# Patient Record
Sex: FEMALE | Race: ASIAN | URBAN - METROPOLITAN AREA
[De-identification: names, ages, dates, MRNs, and addresses within clinical notes are randomized per-mention and may not be internally consistent; named-entity substitution may affect disease eponyms.]

---

## 2022-01-21 ENCOUNTER — APPOINTMENT (RX ONLY)
Dept: URBAN - METROPOLITAN AREA CLINIC 142 | Facility: CLINIC | Age: 42
Setting detail: DERMATOLOGY
End: 2022-01-21

## 2022-01-21 DIAGNOSIS — L73.8 OTHER SPECIFIED FOLLICULAR DISORDERS: ICD-10-CM

## 2022-01-21 DIAGNOSIS — D22 MELANOCYTIC NEVI: ICD-10-CM

## 2022-01-21 DIAGNOSIS — L81.1 CHLOASMA: ICD-10-CM

## 2022-01-21 PROBLEM — D48.5 NEOPLASM OF UNCERTAIN BEHAVIOR OF SKIN: Status: ACTIVE | Noted: 2022-01-21

## 2022-01-21 PROCEDURE — ? IN-HOUSE DISPENSING PHARMACY

## 2022-01-21 PROCEDURE — 99203 OFFICE O/P NEW LOW 30 MIN: CPT | Mod: 25

## 2022-01-21 PROCEDURE — ? BIOPSY BY SHAVE METHOD

## 2022-01-21 PROCEDURE — ? COUNSELING

## 2022-01-21 PROCEDURE — 11102 TANGNTL BX SKIN SINGLE LES: CPT

## 2022-01-21 ASSESSMENT — LOCATION DETAILED DESCRIPTION DERM
LOCATION DETAILED: INFERIOR MID FOREHEAD
LOCATION DETAILED: LEFT BUTTOCK
LOCATION DETAILED: LEFT INFERIOR CENTRAL MALAR CHEEK
LOCATION DETAILED: RIGHT INFERIOR CENTRAL MALAR CHEEK
LOCATION DETAILED: RIGHT SUPERIOR FOREHEAD

## 2022-01-21 ASSESSMENT — LOCATION SIMPLE DESCRIPTION DERM
LOCATION SIMPLE: RIGHT FOREHEAD
LOCATION SIMPLE: LEFT CHEEK
LOCATION SIMPLE: INFERIOR FOREHEAD
LOCATION SIMPLE: RIGHT CHEEK
LOCATION SIMPLE: LEFT BUTTOCK

## 2022-01-21 ASSESSMENT — LOCATION ZONE DERM
LOCATION ZONE: FACE
LOCATION ZONE: TRUNK

## 2022-01-21 NOTE — PROCEDURE: MIPS QUALITY
Quality 154 Part B: Falls: Risk Screening (Should Be Reported With Measure 155.): Patient screened for future fall risk; documentation of no falls in the past year or only one fall without injury in the past year
Quality 110: Preventive Care And Screening: Influenza Immunization: Influenza immunization was not ordered or administered, reason not given
Quality 154 Part A: Falls: Risk Assessment (Should Be Reported With Measure 155.): Falls risk assessment completed and documented in the past 12 months.
Detail Level: Detailed
Quality 155 (Denominator): Falls Plan Of Care: Plan of Care not Documented, Reason not Otherwise Specified
Quality 431: Preventive Care And Screening: Unhealthy Alcohol Use - Screening: Patient identified as an unhealthy alcohol user when screened for unhealthy alcohol use using a systematic screening method and received brief counseling
Quality 226: Preventive Care And Screening: Tobacco Use: Screening And Cessation Intervention: Patient screened for tobacco use and is an ex/non-smoker
Quality 47: Advance Care Plan: Advance care planning not documented, reason not otherwise specified.

## 2022-01-21 NOTE — PROCEDURE: BIOPSY BY SHAVE METHOD
Hide Additional Anticipated Plan?: No
Wound Care: Vaseline
Additional Anesthesia Volume In Cc (Will Not Render If 0): 0
Electrodesiccation And Curettage Text: The wound bed was treated with electrodesiccation and curettage after the biopsy was performed.
Detail Level: Detailed
Anesthesia Volume In Cc: 0.5
Anesthesia Type: 1% lidocaine with epinephrine
Notification Instructions: Patient will be notified of biopsy results. However, patient instructed to call the office if not contacted within 2 weeks.
Biopsy Type: H and E
Consent: Written consent was obtained and risks were reviewed including but not limited to scarring, infection, bleeding, scabbing, incomplete removal, nerve damage and allergy to anesthesia.
Information: Selecting Yes will display possible errors in your note based on the variables you have selected. This validation is only offered as a suggestion for you. PLEASE NOTE THAT THE VALIDATION TEXT WILL BE REMOVED WHEN YOU FINALIZE YOUR NOTE. IF YOU WANT TO FAX A PRELIMINARY NOTE YOU WILL NEED TO TOGGLE THIS TO 'NO' IF YOU DO NOT WANT IT IN YOUR FAXED NOTE.
Dressing: bandage
Biopsy Method: Dermablade
Silver Nitrate Text: The wound bed was treated with silver nitrate after the biopsy was performed.
Billing Type: Third-Party Bill
Type Of Destruction Used: Curettage
Electrodesiccation Text: The wound bed was treated with electrodesiccation after the biopsy was performed.
Post-Care Instructions: I reviewed with the patient in detail post-care instructions. Patient is to keep the biopsy site dry overnight, and then apply bacitracin twice daily until healed. Patient may apply hydrogen peroxide soaks to remove any crusting.
Depth Of Biopsy: dermis
Hemostasis: Drysol
Was A Bandage Applied: Yes
Additional Anticipated Plans: If malignant consider excision
Cryotherapy Text: The wound bed was treated with cryotherapy after the biopsy was performed.

## 2022-04-18 ENCOUNTER — APPOINTMENT (RX ONLY)
Dept: URBAN - METROPOLITAN AREA CLINIC 142 | Facility: CLINIC | Age: 42
Setting detail: DERMATOLOGY
End: 2022-04-18

## 2022-04-18 DIAGNOSIS — L81.1 CHLOASMA: ICD-10-CM | Status: WELL CONTROLLED

## 2022-04-18 PROCEDURE — 99212 OFFICE O/P EST SF 10 MIN: CPT

## 2022-04-18 PROCEDURE — ? TREATMENT REGIMEN

## 2022-04-18 PROCEDURE — ? COUNSELING

## 2022-04-18 ASSESSMENT — LOCATION ZONE DERM: LOCATION ZONE: FACE

## 2022-04-18 ASSESSMENT — LOCATION DETAILED DESCRIPTION DERM
LOCATION DETAILED: RIGHT INFERIOR CENTRAL MALAR CHEEK
LOCATION DETAILED: INFERIOR MID FOREHEAD
LOCATION DETAILED: LEFT INFERIOR CENTRAL MALAR CHEEK

## 2022-04-18 ASSESSMENT — LOCATION SIMPLE DESCRIPTION DERM
LOCATION SIMPLE: RIGHT CHEEK
LOCATION SIMPLE: LEFT CHEEK
LOCATION SIMPLE: INFERIOR FOREHEAD

## 2022-12-27 ENCOUNTER — APPOINTMENT (RX ONLY)
Dept: URBAN - METROPOLITAN AREA CLINIC 142 | Facility: CLINIC | Age: 42
Setting detail: DERMATOLOGY
End: 2022-12-27

## 2022-12-27 DIAGNOSIS — L81.1 CHLOASMA: ICD-10-CM

## 2022-12-27 PROCEDURE — ? IN-HOUSE DISPENSING PHARMACY

## 2022-12-27 NOTE — PROCEDURE: IN-HOUSE DISPENSING PHARMACY
Product 37 Unit Type: mg
Product 18 Refills: 0
Product 1 Refills: 1
Product 2 Unit Type: ml
Name Of Product 3: Hydroquinone Emulsion
Product 1 Amount/Unit (Numbers Only): 30
Product 3 Refills: 2
Name Of Product 2: Brimodidine Rosacea Gel
Detail Level: Zone
Product 2 Application Directions: Apply to face QAM
Name Of Product 1: Rosacea Triple Gel
Send Charges To Patient Encounter: Yes
Product 3 Application Directions: Apply to whole face QHS for 3 months, then take one month off, repeat as needed
Product 2 Refills: 3

## 2023-11-07 ENCOUNTER — APPOINTMENT (RX ONLY)
Dept: URBAN - METROPOLITAN AREA CLINIC 141 | Facility: CLINIC | Age: 43
Setting detail: DERMATOLOGY
End: 2023-11-07

## 2023-11-07 DIAGNOSIS — Z41.9 ENCOUNTER FOR PROCEDURE FOR PURPOSES OTHER THAN REMEDYING HEALTH STATE, UNSPECIFIED: ICD-10-CM

## 2023-11-07 PROCEDURE — ? INVENTORY

## 2023-11-07 PROCEDURE — ? CLEAR AND BRILLIANT LASER

## 2023-11-07 ASSESSMENT — LOCATION ZONE DERM: LOCATION ZONE: FACE

## 2023-11-07 ASSESSMENT — LOCATION DETAILED DESCRIPTION DERM
LOCATION DETAILED: LEFT INFERIOR MEDIAL FOREHEAD
LOCATION DETAILED: INFERIOR MID FOREHEAD

## 2023-11-07 ASSESSMENT — LOCATION SIMPLE DESCRIPTION DERM
LOCATION SIMPLE: INFERIOR FOREHEAD
LOCATION SIMPLE: LEFT FOREHEAD

## 2023-11-07 NOTE — PROCEDURE: CLEAR AND BRILLIANT LASER
Length Of Topical Anesthesia Application (Optional): 30 minutes
Post-Procedure Care: Avene gentle moisturizer and Avene thermal water mist was applied to patients face immediately post treatment. Ice packs were given to patient to apply for five minutes on and five minutes off for about thirty minutes. Post care was verbally reviewed with patient and patient was given a paper sheet to take home with detailed post care instructions.
Laser Type: Clear and Brilliant 1440nm
Post-Care Instructions: I reviewed with the patient in detail post-care instructions. Patient should stay away from the sun for 5-7 days and wear sun protection until treated areas are fully healed.
Consent: Written consent obtained, risks reviewed including but not limited to crusting, scabbing, blistering, scarring, darker or lighter pigmentary change, incidental hair removal, bruising, and/or incomplete removal.
Topical Anesthesia?: PLAST (20% benzocaine, 8% lidocaine, 4% tetracaine)
Energy Level: High
Detail Level: Zone
Passes (Will Not Render If 0): 8

## 2023-11-07 NOTE — HPI: OTHER
Condition:: Uneven skin textures and tones on face
Please Describe Your Condition:: Patient is interested in clear and brilliant

## 2023-12-12 ENCOUNTER — APPOINTMENT (RX ONLY)
Dept: URBAN - METROPOLITAN AREA CLINIC 141 | Facility: CLINIC | Age: 43
Setting detail: DERMATOLOGY
End: 2023-12-12

## 2023-12-12 DIAGNOSIS — Z41.9 ENCOUNTER FOR PROCEDURE FOR PURPOSES OTHER THAN REMEDYING HEALTH STATE, UNSPECIFIED: ICD-10-CM

## 2023-12-12 PROCEDURE — ? INVENTORY

## 2023-12-12 PROCEDURE — ? ADDITIONAL NOTES

## 2023-12-12 PROCEDURE — ? CLEAR AND BRILLIANT LASER

## 2023-12-12 ASSESSMENT — LOCATION ZONE DERM: LOCATION ZONE: FACE

## 2023-12-12 ASSESSMENT — LOCATION DETAILED DESCRIPTION DERM: LOCATION DETAILED: INFERIOR MID FOREHEAD

## 2023-12-12 ASSESSMENT — LOCATION SIMPLE DESCRIPTION DERM: LOCATION SIMPLE: INFERIOR FOREHEAD

## 2023-12-12 NOTE — PROCEDURE: CLEAR AND BRILLIANT LASER
Detail Level: Zone
Length Of Topical Anesthesia Application (Optional): 30 minutes
Consent: Written consent obtained, risks reviewed including but not limited to crusting, scabbing, blistering, scarring, darker or lighter pigmentary change, incidental hair removal, bruising, and/or incomplete removal.
Post-Procedure Care: Avene gentle moisturizer and Avene thermal water mist was applied to patients face immediately post treatment. Ice packs were given to patient to apply for five minutes on and five minutes off for about thirty minutes. Post care was verbally reviewed with patient and patient was given a paper sheet to take home with detailed post care instructions.
Post-Care Instructions: I reviewed with the patient in detail post-care instructions. Patient should stay away from the sun for 5-7 days and wear sun protection until treated areas are fully healed.
Laser Type: Clear and Brilliant 1440nm
Passes (Will Not Render If 0): 8
Topical Anesthesia?: PLAST (20% benzocaine, 8% lidocaine, 4% tetracaine)
Energy Level: High

## 2023-12-12 NOTE — HPI: OTHER
Condition:: Uneven skin textures and tones on face
Please Describe Your Condition:: Patient is interested in clear and brilliant treatment

## 2023-12-12 NOTE — PROCEDURE: ADDITIONAL NOTES
Render Risk Assessment In Note?: no
Additional Notes: Treatment #2 at todays visit.
Detail Level: Simple

## 2024-01-09 ENCOUNTER — APPOINTMENT (RX ONLY)
Dept: URBAN - METROPOLITAN AREA CLINIC 141 | Facility: CLINIC | Age: 44
Setting detail: DERMATOLOGY
End: 2024-01-09

## 2024-01-09 DIAGNOSIS — Z41.9 ENCOUNTER FOR PROCEDURE FOR PURPOSES OTHER THAN REMEDYING HEALTH STATE, UNSPECIFIED: ICD-10-CM

## 2024-01-09 PROCEDURE — ? INVENTORY

## 2024-01-09 PROCEDURE — ? CLEAR AND BRILLIANT LASER

## 2024-01-09 ASSESSMENT — LOCATION ZONE DERM: LOCATION ZONE: FACE

## 2024-01-09 ASSESSMENT — LOCATION SIMPLE DESCRIPTION DERM: LOCATION SIMPLE: LEFT FOREHEAD

## 2024-01-09 ASSESSMENT — LOCATION DETAILED DESCRIPTION DERM: LOCATION DETAILED: LEFT INFERIOR MEDIAL FOREHEAD

## 2024-01-09 NOTE — HPI: OTHER
Condition:: Hyperpigmentation on face and uneven skin textures and tones.
Please Describe Your Condition:: Patient is interested in clear and brilliant

## 2024-01-09 NOTE — PROCEDURE: CLEAR AND BRILLIANT LASER
Post-Procedure Care: Avene gentle moisturizer and Avene thermal water mist was applied to patients face immediately post treatment. Ice packs were given to patient to apply for five minutes on and five minutes off for about thirty minutes. Post care was verbally reviewed with patient and patient was given a paper sheet to take home with detailed post care instructions.
Energy Level: Low
Consent: Written consent obtained, risks reviewed including but not limited to crusting, scabbing, blistering, scarring, darker or lighter pigmentary change, incidental hair removal, bruising, and/or incomplete removal.
Energy Level: High
Post-Care Instructions: I reviewed with the patient in detail post-care instructions. Patient should stay away from the sun for 5-7 days and wear sun protection until treated areas are fully healed.
Topical Anesthesia?: 20% benzocaine, 10% lidocaine, 4% tetracaine
Length Of Topical Anesthesia Application (Optional): 30 minutes
Detail Level: Zone
Laser Type: Clear and Brilliant 1440nm
Passes (Will Not Render If 0): 8

## 2024-04-23 ENCOUNTER — APPOINTMENT (RX ONLY)
Dept: URBAN - METROPOLITAN AREA CLINIC 141 | Facility: CLINIC | Age: 44
Setting detail: DERMATOLOGY
End: 2024-04-23

## 2024-04-23 DIAGNOSIS — Z41.9 ENCOUNTER FOR PROCEDURE FOR PURPOSES OTHER THAN REMEDYING HEALTH STATE, UNSPECIFIED: ICD-10-CM

## 2024-04-23 PROCEDURE — ? VI PEEL

## 2024-04-23 PROCEDURE — ? INVENTORY

## 2024-04-23 ASSESSMENT — LOCATION DETAILED DESCRIPTION DERM
LOCATION DETAILED: GLABELLA
LOCATION DETAILED: RIGHT INFERIOR MEDIAL FOREHEAD

## 2024-04-23 ASSESSMENT — LOCATION SIMPLE DESCRIPTION DERM
LOCATION SIMPLE: RIGHT FOREHEAD
LOCATION SIMPLE: GLABELLA

## 2024-04-23 ASSESSMENT — LOCATION ZONE DERM: LOCATION ZONE: FACE

## 2024-04-23 NOTE — PROCEDURE: VI PEEL
Prep: The treated area was degreased with pre-peel cleanser, and vaseline was applied for protection of mucous membranes.
Chemical Peel: VI Peel Precision Plus
Post-Care Instructions: I reviewed with the patient in detail post-care instructions. Patient should avoid sun exposure and wear sun protection.
Detail Level: Zone
Consent: Prior to the procedure, written consent was obtained and risks were reviewed, including but not limited to: redness, peeling, blistering, pigmentary change, scarring, infection, and pain. Patient is aware multiple treatments may be necessary to achieve the desired outcome.
Post Peel Care: After the procedure, the patient was instructed not to wash the treated area for 6-8 hours or manually remove dead skin when the peeling process starts. Patient may use OTC hydrocortisone cream for itching.  Patient instructed to use the provided Retin-A wipes on the treated area on the 1st and 2nd nights.
Comments: Lot #[id number] expiration 7/2024
Treatment Number: 1

## 2024-04-23 NOTE — HPI: OTHER
Condition:: Hyperpigmentation on face
Please Describe Your Condition:: Patient is interested in vipeel at todays visit